# Patient Record
Sex: FEMALE | Race: WHITE
[De-identification: names, ages, dates, MRNs, and addresses within clinical notes are randomized per-mention and may not be internally consistent; named-entity substitution may affect disease eponyms.]

---

## 2018-03-27 ENCOUNTER — HOSPITAL ENCOUNTER (EMERGENCY)
Dept: HOSPITAL 96 - M.ERS | Age: 78
Discharge: HOME | End: 2018-03-27
Payer: MEDICARE

## 2018-03-27 VITALS — WEIGHT: 207.01 LBS | HEIGHT: 65 IN | BODY MASS INDEX: 34.49 KG/M2

## 2018-03-27 VITALS — SYSTOLIC BLOOD PRESSURE: 146 MMHG | DIASTOLIC BLOOD PRESSURE: 77 MMHG

## 2018-03-27 DIAGNOSIS — E78.5: ICD-10-CM

## 2018-03-27 DIAGNOSIS — E03.9: ICD-10-CM

## 2018-03-27 DIAGNOSIS — Z88.1: ICD-10-CM

## 2018-03-27 DIAGNOSIS — Z96.651: ICD-10-CM

## 2018-03-27 DIAGNOSIS — Z90.710: ICD-10-CM

## 2018-03-27 DIAGNOSIS — R07.89: Primary | ICD-10-CM

## 2018-03-27 DIAGNOSIS — Z88.0: ICD-10-CM

## 2018-03-27 LAB
ABSOLUTE BASOPHILS: 0 THOU/UL (ref 0–0.2)
ABSOLUTE EOSINOPHILS: 0.1 THOU/UL (ref 0–0.7)
ABSOLUTE MONOCYTES: 0.6 THOU/UL (ref 0–1.2)
ALBUMIN SERPL-MCNC: 3.5 G/DL (ref 3.4–5)
ALP SERPL-CCNC: 92 U/L (ref 46–116)
ALT SERPL-CCNC: 25 U/L (ref 30–65)
ANION GAP SERPL CALC-SCNC: 8 MMOL/L (ref 7–16)
AST SERPL-CCNC: 15 U/L (ref 15–37)
BASOPHILS NFR BLD AUTO: 0.5 %
BILIRUB SERPL-MCNC: 0.3 MG/DL
BILIRUB UR-MCNC: NEGATIVE MG/DL
BUN SERPL-MCNC: 14 MG/DL (ref 7–18)
CALCIUM SERPL-MCNC: 9 MG/DL (ref 8.5–10.1)
CHLORIDE SERPL-SCNC: 108 MMOL/L (ref 98–107)
CO2 SERPL-SCNC: 27 MMOL/L (ref 21–32)
COLOR UR: YELLOW
CREAT SERPL-MCNC: 0.9 MG/DL (ref 0.6–1.3)
EOSINOPHIL NFR BLD: 1.4 %
GLUCOSE SERPL-MCNC: 106 MG/DL (ref 70–99)
GRANULOCYTES NFR BLD MANUAL: 80.1 %
HCT VFR BLD CALC: 41.7 % (ref 37–47)
HGB BLD-MCNC: 14 GM/DL (ref 12–15)
KETONES UR STRIP-MCNC: NEGATIVE MG/DL
LYMPHOCYTES # BLD: 1.1 THOU/UL (ref 0.8–5.3)
LYMPHOCYTES NFR BLD AUTO: 11.5 %
MCH RBC QN AUTO: 30.5 PG (ref 26–34)
MCHC RBC AUTO-ENTMCNC: 33.6 G/DL (ref 28–37)
MCV RBC: 90.9 FL (ref 80–100)
MONOCYTES NFR BLD: 6.5 %
MPV: 8.7 FL. (ref 7.2–11.1)
NEUTROPHILS # BLD: 7.4 THOU/UL (ref 1.6–8.1)
NUCLEATED RBCS: 0 /100WBC
PLATELET COUNT*: 203 THOU/UL (ref 150–400)
POTASSIUM SERPL-SCNC: 4.2 MMOL/L (ref 3.5–5.1)
PROT SERPL-MCNC: 6.9 G/DL (ref 6.4–8.2)
PROT UR QL STRIP: NEGATIVE
RBC # BLD AUTO: 4.59 MIL/UL (ref 4.2–5)
RBC # UR STRIP: NEGATIVE /UL
RBC #/AREA URNS HPF: (no result) /HPF (ref 0–2)
RDW-CV: 13.2 % (ref 10.5–14.5)
SODIUM SERPL-SCNC: 143 MMOL/L (ref 136–145)
SP GR UR STRIP: 1.01 (ref 1–1.03)
SQUAMOUS: (no result) /LPF (ref 0–3)
TROPONIN-I LEVEL: <0.06 NG/ML (ref ?–0.06)
URINE CLARITY: CLEAR
URINE GLUCOSE-RANDOM: NEGATIVE
URINE LEUKOCYTES-REFLEX: (no result)
URINE NITRITE-REFLEX: NEGATIVE
URINE WBC-REFLEX: (no result) /HPF (ref 0–5)
UROBILINOGEN UR STRIP-ACNC: 0.2 E.U./DL (ref 0.2–1)
WBC # BLD AUTO: 9.2 THOU/UL (ref 4–11)

## 2018-03-27 NOTE — EKG
[place] [address]
Phone:  [phone]                     ELECTROCARDIOGRAM REPORT      
_______________________________________________________________________________
 
Name:       BLANCA MAYES                Room:                      [facility]  
M.R.#:  [id number]      Account #:      [id number]  
Admission:  18     Attend Phys:                         
Discharge:               Date of Birth:  [birth year]  
         Report #: [id number]
    [id number]
_______________________________________________________________________________
THIS REPORT FOR:  //name//                      
 
                         [facility] ED
                                       
Test Date:    2018               Test Time:    15:05:03
Pat Name:     BLANCA MAYES            Department:   
Patient ID:   [id number]            Room:          
Gender:       F                        Technician:   KATERINE
:          1940               Requested By: Rosemary Deshpande
Order Number: [id number]    Jose Rafael MD:   Angel Evans
                                 Measurements
Intervals                              Axis          
Rate:         67                       P:            66
MD:           241                      QRS:          13
QRSD:         88                       T:            26
QT:           412                                    
QTc:          435                                    
                           Interpretive Statements
Sinus rhythm
Multiple ventricular premature complexes
Low voltage, precordial leads
Compared to ECG 2014 13:05:42
Ventricular premature complex(es) now present
Low QRS voltage now present
 
Electronically Signed On 3- 16:24:39 CDT by Angel Evans
[email or web address]
 
 
 
 
 
 
 
 
 
 
 
 
 
 
 
 
  <ELECTRONICALLY SIGNED>
                                           By: Angel Evans MD, [facility]   
  18     1624
D: 18 1505   _____________________________________
T: 18 1505   Angel Evans MD, [facility]     /EPI

## 2018-09-06 ENCOUNTER — HOSPITAL ENCOUNTER (EMERGENCY)
Dept: HOSPITAL 96 - M.ERS | Age: 78
Discharge: HOME | End: 2018-09-06
Payer: MEDICARE

## 2018-09-06 VITALS — HEIGHT: 65 IN | BODY MASS INDEX: 34.66 KG/M2 | WEIGHT: 208.01 LBS

## 2018-09-06 VITALS — SYSTOLIC BLOOD PRESSURE: 138 MMHG | DIASTOLIC BLOOD PRESSURE: 77 MMHG

## 2018-09-06 DIAGNOSIS — Z88.1: ICD-10-CM

## 2018-09-06 DIAGNOSIS — Z90.710: ICD-10-CM

## 2018-09-06 DIAGNOSIS — E03.9: ICD-10-CM

## 2018-09-06 DIAGNOSIS — Z95.5: ICD-10-CM

## 2018-09-06 DIAGNOSIS — E78.5: ICD-10-CM

## 2018-09-06 DIAGNOSIS — Z88.0: ICD-10-CM

## 2018-09-06 DIAGNOSIS — M54.42: Primary | ICD-10-CM

## 2018-09-06 DIAGNOSIS — Z88.8: ICD-10-CM

## 2019-11-19 ENCOUNTER — HOSPITAL ENCOUNTER (EMERGENCY)
Dept: HOSPITAL 96 - M.ERS | Age: 79
Discharge: HOME | End: 2019-11-19
Payer: MEDICARE

## 2019-11-19 VITALS — HEIGHT: 64 IN | WEIGHT: 205.01 LBS | BODY MASS INDEX: 35 KG/M2

## 2019-11-19 VITALS — DIASTOLIC BLOOD PRESSURE: 98 MMHG | SYSTOLIC BLOOD PRESSURE: 147 MMHG

## 2019-11-19 DIAGNOSIS — S92.411A: ICD-10-CM
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
## 2019-11-19 DIAGNOSIS — Z88.8: ICD-10-CM

## 2019-11-19 DIAGNOSIS — S52.611A: Primary | ICD-10-CM

## 2019-11-19 DIAGNOSIS — Z90.710: ICD-10-CM
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
## 2019-11-19 DIAGNOSIS — Z88.0: ICD-10-CM

## 2019-11-19 DIAGNOSIS — E03.9: ICD-10-CM

## 2019-11-19 DIAGNOSIS — Z95.5: ICD-10-CM

## 2019-11-19 DIAGNOSIS — E78.5: ICD-10-CM

## 2019-11-19 DIAGNOSIS — Z88.1: ICD-10-CM
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
## 2019-11-19 DIAGNOSIS — Z85.3: ICD-10-CM
